# Patient Record
Sex: MALE | Race: BLACK OR AFRICAN AMERICAN | NOT HISPANIC OR LATINO | Employment: FULL TIME | URBAN - METROPOLITAN AREA
[De-identification: names, ages, dates, MRNs, and addresses within clinical notes are randomized per-mention and may not be internally consistent; named-entity substitution may affect disease eponyms.]

---

## 2020-09-02 ENCOUNTER — OFFICE VISIT (OUTPATIENT)
Dept: URGENT CARE | Facility: CLINIC | Age: 30
End: 2020-09-02

## 2020-09-02 VITALS
TEMPERATURE: 98.2 F | OXYGEN SATURATION: 97 % | WEIGHT: 186 LBS | RESPIRATION RATE: 18 BRPM | DIASTOLIC BLOOD PRESSURE: 90 MMHG | HEIGHT: 70 IN | SYSTOLIC BLOOD PRESSURE: 138 MMHG | BODY MASS INDEX: 26.63 KG/M2 | HEART RATE: 87 BPM

## 2020-09-02 DIAGNOSIS — K12.2 UVULITIS: ICD-10-CM

## 2020-09-02 DIAGNOSIS — J02.9 PHARYNGITIS, UNSPECIFIED ETIOLOGY: ICD-10-CM

## 2020-09-02 LAB
INT CON NEG: NORMAL
INT CON POS: NORMAL
S PYO AG THROAT QL: NORMAL

## 2020-09-02 PROCEDURE — 87880 STREP A ASSAY W/OPTIC: CPT | Performed by: NURSE PRACTITIONER

## 2020-09-02 PROCEDURE — 99204 OFFICE O/P NEW MOD 45 MIN: CPT | Mod: 25 | Performed by: NURSE PRACTITIONER

## 2020-09-02 RX ORDER — AMOXICILLIN 500 MG/1
500 CAPSULE ORAL 2 TIMES DAILY
Qty: 20 CAP | Refills: 0 | Status: SHIPPED | OUTPATIENT
Start: 2020-09-02 | End: 2020-09-12

## 2020-09-02 RX ORDER — DEXAMETHASONE SODIUM PHOSPHATE 10 MG/ML
10 INJECTION INTRAMUSCULAR; INTRAVENOUS ONCE
Status: COMPLETED | OUTPATIENT
Start: 2020-09-02 | End: 2020-09-02

## 2020-09-02 RX ADMIN — DEXAMETHASONE SODIUM PHOSPHATE 10 MG: 10 INJECTION INTRAMUSCULAR; INTRAVENOUS at 19:42

## 2020-09-02 ASSESSMENT — ENCOUNTER SYMPTOMS
NECK PAIN: 0
SWOLLEN GLANDS: 1
EYE REDNESS: 0
NAUSEA: 0
SHORTNESS OF BREATH: 0
MYALGIAS: 0
VOMITING: 0
FEVER: 0
DIARRHEA: 0
COUGH: 0
HEADACHES: 0
DIZZINESS: 0
CHILLS: 1
SORE THROAT: 1
TROUBLE SWALLOWING: 1

## 2020-09-02 NOTE — LETTER
September 2, 2020         Patient: Gian Godwin   YOB: 1990   Date of Visit: 9/2/2020           To Whom it May Concern:    Gian Godwin was seen in my clinic on 9/2/2020. He may return to work on 9/4/2020.    If you have any questions or concerns, please don't hesitate to call.        Sincerely,           JONATHAN Estrada  Electronically Signed

## 2020-09-03 NOTE — PROGRESS NOTES
Subjective:     Gian Godwin  is a 29 y.o. male who presents for Oral Swelling (x 2 days )  Patient with no recent travel and/or close contact with COVID-19.        Pharyngitis   This is a new problem. Episode onset: 2 days;  The problem has been gradually worsening. Neither side of throat is experiencing more pain than the other. The maximum temperature recorded prior to his arrival was 100.4 - 100.9 F. The fever has been present for less than 1 day. The pain is at a severity of 8/10. The pain is moderate. Associated symptoms include swollen glands and trouble swallowing. Pertinent negatives include no congestion, coughing, diarrhea, drooling, ear discharge, ear pain, headaches, neck pain, shortness of breath or vomiting. He has had no exposure to strep or mono. He has tried acetaminophen for the symptoms. The treatment provided no relief.   History reviewed. No pertinent past medical history.History reviewed. No pertinent surgical history.  Social History     Socioeconomic History   • Marital status: Single     Spouse name: Not on file   • Number of children: Not on file   • Years of education: Not on file   • Highest education level: Not on file   Occupational History   • Not on file   Social Needs   • Financial resource strain: Not on file   • Food insecurity     Worry: Not on file     Inability: Not on file   • Transportation needs     Medical: Not on file     Non-medical: Not on file   Tobacco Use   • Smoking status: Not on file   Substance and Sexual Activity   • Alcohol use: Not on file   • Drug use: Not on file   • Sexual activity: Not on file   Lifestyle   • Physical activity     Days per week: Not on file     Minutes per session: Not on file   • Stress: Not on file   Relationships   • Social connections     Talks on phone: Not on file     Gets together: Not on file     Attends [employer] service: Not on file     Active member of club or organization: Not on file     Attends meetings of clubs or  "organizations: Not on file     Relationship status: Not on file   • Intimate partner violence     Fear of current or ex partner: Not on file     Emotionally abused: Not on file     Physically abused: Not on file     Forced sexual activity: Not on file   Other Topics Concern   • Not on file   Social History Narrative   • Not on file    History reviewed. No pertinent family history. Review of Systems   Constitutional: Positive for chills. Negative for fever.   HENT: Positive for sore throat and trouble swallowing. Negative for congestion, drooling, ear discharge and ear pain.    Eyes: Negative for redness.   Respiratory: Negative for cough and shortness of breath.    Cardiovascular: Negative for chest pain.   Gastrointestinal: Negative for diarrhea, nausea and vomiting.   Genitourinary: Negative for dysuria.   Musculoskeletal: Negative for myalgias and neck pain.   Skin: Negative for rash.   Neurological: Negative for dizziness and headaches.   No Known Allergies   Objective:   /90   Pulse 87   Temp 36.8 °C (98.2 °F) (Temporal)   Resp 18   Ht 1.778 m (5' 10\")   Wt 84.4 kg (186 lb)   SpO2 97%   BMI 26.69 kg/m²   Physical Exam  Vitals signs and nursing note reviewed.   Constitutional:       General: He is not in acute distress.     Appearance: He is well-developed.   HENT:      Head: Normocephalic and atraumatic.      Right Ear: External ear normal.      Left Ear: External ear normal.      Nose: Nose normal.      Mouth/Throat:      Mouth: Mucous membranes are moist.      Pharynx: Pharyngeal swelling, posterior oropharyngeal erythema and uvula swelling present.      Tonsils: 1+ on the right. 1+ on the left.   Eyes:      Conjunctiva/sclera: Conjunctivae normal.   Cardiovascular:      Rate and Rhythm: Normal rate.   Pulmonary:      Effort: Pulmonary effort is normal. No respiratory distress.      Breath sounds: Normal breath sounds.   Abdominal:      General: There is no distension.   Musculoskeletal: Normal " range of motion.   Lymphadenopathy:      Cervical: Cervical adenopathy present.   Skin:     General: Skin is warm and dry.   Neurological:      General: No focal deficit present.      Mental Status: He is alert and oriented to person, place, and time. Mental status is at baseline.      Gait: Gait (gait at baseline) normal.   Psychiatric:         Judgment: Judgment normal.           Assessment/Plan:   Assessment    1. Pharyngitis, unspecified etiology  - POCT Rapid Strep A  - dexamethasone (DECADRON) injection (check route below) 10 mg  - amoxicillin (AMOXIL) 500 MG Cap; Take 1 Cap by mouth 2 times a day for 10 days.  Dispense: 20 Cap; Refill: 0    2. Uvulitis  - dexamethasone (DECADRON) injection (check route below) 10 mg  - amoxicillin (AMOXIL) 500 MG Cap; Take 1 Cap by mouth 2 times a day for 10 days.  Dispense: 20 Cap; Refill: 0  Strep negative  Advised to continue supportive care with Tylenol and/or ibuprofen for fevers and discomfort. Increased fluids and electrolytes.   Differential diagnosis, natural history, supportive care, and indications for immediate follow-up discussed.

## 2021-07-03 ENCOUNTER — OFFICE VISIT (OUTPATIENT)
Dept: URGENT CARE | Facility: CLINIC | Age: 31
End: 2021-07-03

## 2021-07-03 VITALS
SYSTOLIC BLOOD PRESSURE: 102 MMHG | DIASTOLIC BLOOD PRESSURE: 70 MMHG | BODY MASS INDEX: 25.9 KG/M2 | RESPIRATION RATE: 16 BRPM | HEIGHT: 71 IN | TEMPERATURE: 98.1 F | WEIGHT: 185 LBS | HEART RATE: 66 BPM | OXYGEN SATURATION: 100 %

## 2021-07-03 DIAGNOSIS — S86.911A STRAIN OF RIGHT KNEE, INITIAL ENCOUNTER: ICD-10-CM

## 2021-07-03 DIAGNOSIS — M25.561 ACUTE PAIN OF RIGHT KNEE: ICD-10-CM

## 2021-07-03 PROCEDURE — 99214 OFFICE O/P EST MOD 30 MIN: CPT | Performed by: NURSE PRACTITIONER

## 2021-07-03 RX ORDER — NAPROXEN 500 MG/1
500 TABLET ORAL
Qty: 60 TABLET | Refills: 0 | Status: SHIPPED | OUTPATIENT
Start: 2021-07-03 | End: 2023-10-12

## 2021-07-03 RX ORDER — NAPROXEN 500 MG/1
500 TABLET ORAL
Qty: 40 TABLET | Refills: 0 | Status: SHIPPED | OUTPATIENT
Start: 2021-07-03 | End: 2021-07-03

## 2021-07-03 RX ORDER — FLUTICASONE PROPIONATE 50 MCG
2 SPRAY, SUSPENSION (ML) NASAL DAILY
Qty: 16 G | Refills: 0 | Status: SHIPPED | OUTPATIENT
Start: 2021-07-03 | End: 2021-07-03

## 2021-07-03 RX ORDER — NAPROXEN 500 MG/1
500 TABLET ORAL
Qty: 60 TABLET | Refills: 0 | Status: SHIPPED | OUTPATIENT
Start: 2021-07-03 | End: 2021-07-03 | Stop reason: SDUPTHER

## 2021-07-03 ASSESSMENT — LIFESTYLE VARIABLES: SUBSTANCE_ABUSE: 0

## 2021-07-03 ASSESSMENT — ENCOUNTER SYMPTOMS
FALLS: 0
NAUSEA: 0
CHILLS: 0
FEVER: 0

## 2021-07-04 NOTE — NON-PROVIDER
Patient reported having similar pain in knees in middle school, which self-resolved after several months. He was not seen for it at the time.

## 2021-07-04 NOTE — PROGRESS NOTES
"Gian Godwin is a 30 y.o. male who presents for Knee Pain (x 1 day, R knee pain/stiffness, atraumatic, limited ROM, tender to palpation)      HPI this new problem.  Fredy is a 30-year-old male patient presents with right knee pain x1 day.  He is experiencing pain and stiffness in his knee.  He finds it hard to flex his knee fully..  It hurts when he tries to drive.  Pressing on the gas makes it more painful.  His range of motion is generally limited.  He has pain when he palpates around the lower knee.  He had an injury when he was in middle school that he recalls that was never treated.  It took months for his knee to heal but it healed up just fine.  He has never had any other problems.  He denies trauma or unusual activity.  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  Treatments tried: Aspirin.  He has had no relief of his pain.  No other aggravating alleviating factors.    Review of Systems   Constitutional: Negative for chills and fever.   Gastrointestinal: Negative for nausea.   Musculoskeletal: Positive for joint pain. Negative for falls.   Endo/Heme/Allergies: Negative for environmental allergies.   Psychiatric/Behavioral: Negative for substance abuse.       Allergies:     No Known Allergies    PMSFS Hx:  History reviewed. No pertinent past medical history.  History reviewed. No pertinent surgical history.  History reviewed. No pertinent family history.  Social History     Tobacco Use   • Smoking status: Unknown If Ever Smoked   Substance Use Topics   • Alcohol use: Not on file       Problems:   There is no problem list on file for this patient.      Medications:   No current outpatient medications on file prior to visit.     No current facility-administered medications on file prior to visit.          Objective:     /70 (BP Location: Left arm, Patient Position: Sitting, BP Cuff Size: Adult)   Pulse 66   Temp 36.7 °C (98.1 °F) (Temporal)   Resp 16   Ht 1.803 m (5' 11\")   Wt 83.9 kg (185 lb)   SpO2 100% "   BMI 25.80 kg/m²     Physical Exam  Vitals reviewed.   Constitutional:       General: He is not in acute distress.     Appearance: Normal appearance. He is normal weight.   Cardiovascular:      Rate and Rhythm: Normal rate.      Pulses: Normal pulses.   Pulmonary:      Effort: Pulmonary effort is normal.   Musculoskeletal:      Right knee: Swelling (mild infrapatellar swelling) and crepitus present. No effusion, erythema or bony tenderness. Decreased range of motion (flexion is limited). No LCL laxity, MCL laxity, ACL laxity or PCL laxity. Normal alignment, normal meniscus and normal patellar mobility. Normal pulse.      Instability Tests: Anterior drawer test negative. Posterior drawer test negative. Anterior Lachman test negative. Medial Kody test negative and lateral Kody test negative.      Right lower leg: Normal.   Skin:     General: Skin is warm.      Capillary Refill: Capillary refill takes less than 2 seconds.   Neurological:      Mental Status: He is alert.   Psychiatric:         Mood and Affect: Mood normal.         Behavior: Behavior normal.         Thought Content: Thought content normal.         Assessment /Associated Orders:      1. Strain of right knee, initial encounter  REFERRAL TO SPORTS MEDICINE    naproxen (NAPROSYN) 500 MG Tab    DISCONTINUED: naproxen (NAPROSYN) 500 MG Tab   2. Acute pain of right knee  REFERRAL TO SPORTS MEDICINE    naproxen (NAPROSYN) 500 MG Tab    DISCONTINUED: naproxen (NAPROSYN) 500 MG Tab       Medical Decision Making:    Pt is clinically stable at today's acute urgent care visit.  No acute distress noted. Appropriate for outpatient management at this time.   Acute problem today with uncertain prognosis.   Educated in proper administration of medication(s) ordered today including safety, possible SE, risks, benefits, rationale and alternatives to therapy. Take with food. Do not take additional NSAIDS while using Naprosyn . OTC acetaminophen for breakthrough  pain. Dosage and directions per . Do not exceed 3000 mg in 24 hours.   Ice packs  Declines knee brace at this time   OTC analgesic cream/ gel of choice prn pain     Advised to follow-up with the primary care provider for recheck, reevaluation, and consideration of further management if necessary.   Discussed management options (risks,benefits, and alternatives to treatment). Expressed understanding and the treatment plan was agreed upon. Questions were encouraged and answered       Return to urgent care prn if new or worsening sx or if there is no improvement in condition prn.  Educated in Red flags and indications to immediately call 911 or present to the Emergency Department.     I personally reviewed prior external notes and test results pertinent to today's visit.  I have independently reviewed and interpreted all diagnostics ordered during this urgent care acute visit.   Time spent evaluating this patient was at least 30 minutes and includes preparing for visit, counseling/education, exam and evaluation, obtaining history, independent interpretation, ordering lab/test/procedures,medication management and documentation.

## 2021-12-20 ENCOUNTER — OFFICE VISIT (OUTPATIENT)
Dept: URGENT CARE | Facility: CLINIC | Age: 31
End: 2021-12-20
Payer: COMMERCIAL

## 2021-12-20 ENCOUNTER — APPOINTMENT (OUTPATIENT)
Dept: RADIOLOGY | Facility: IMAGING CENTER | Age: 31
End: 2021-12-20
Attending: STUDENT IN AN ORGANIZED HEALTH CARE EDUCATION/TRAINING PROGRAM
Payer: COMMERCIAL

## 2021-12-20 VITALS
SYSTOLIC BLOOD PRESSURE: 126 MMHG | RESPIRATION RATE: 18 BRPM | WEIGHT: 180 LBS | TEMPERATURE: 97.2 F | HEIGHT: 71 IN | BODY MASS INDEX: 25.2 KG/M2 | OXYGEN SATURATION: 100 % | HEART RATE: 80 BPM | DIASTOLIC BLOOD PRESSURE: 58 MMHG

## 2021-12-20 DIAGNOSIS — M25.561 ACUTE PAIN OF RIGHT KNEE: ICD-10-CM

## 2021-12-20 PROCEDURE — 99214 OFFICE O/P EST MOD 30 MIN: CPT | Performed by: STUDENT IN AN ORGANIZED HEALTH CARE EDUCATION/TRAINING PROGRAM

## 2021-12-20 PROCEDURE — 73564 X-RAY EXAM KNEE 4 OR MORE: CPT | Mod: TC,FY,RT | Performed by: NURSE PRACTITIONER

## 2021-12-20 RX ORDER — LIDOCAINE 4 G/G
1 PATCH TOPICAL DAILY
Qty: 5 PATCH | Refills: 0 | Status: SHIPPED | OUTPATIENT
Start: 2021-12-20 | End: 2021-12-25

## 2021-12-20 ASSESSMENT — ENCOUNTER SYMPTOMS
FEVER: 0
EYE PAIN: 0
HEADACHES: 0
CARDIOVASCULAR NEGATIVE: 1
FALLS: 0
CHILLS: 0
NAUSEA: 0
RESPIRATORY NEGATIVE: 1
VOMITING: 0

## 2021-12-20 NOTE — PROGRESS NOTES
"Subjective     Gian Godwin is a 31 y.o. male who presents with Knee Pain (RIGHT, continued since previous visit, worsening over time)            HPI    Patient reported R Knee pain and swelling for 3 days. The pain is right under knee area, 6/10 in intensity, stiff in the morning, worsening with the flexing and lateral stretching. He took naproxen, but didn't help. Reported mild numbness with bending. He denies significant trauma or injury. His work is related to some heavy lifting.   He reported similar right knee pain in 7/2021. He was prescribed for naproxen at that time. He said the pain was resolved but he started to have knee pain again 3 days ago    Review of Systems   Constitutional: Negative for chills and fever.   Eyes: Negative for pain.   Respiratory: Negative.    Cardiovascular: Negative.    Gastrointestinal: Negative for nausea and vomiting.   Musculoskeletal: Positive for joint pain. Negative for falls.   Skin: Negative for itching.   Neurological: Negative for headaches.              Objective     /58   Pulse 80   Temp 36.2 °C (97.2 °F) (Temporal)   Resp 18   Ht 1.803 m (5' 11\")   Wt 81.6 kg (180 lb)   SpO2 100%   BMI 25.10 kg/m²      Physical Exam  Constitutional:       Appearance: Normal appearance.   HENT:      Head: Normocephalic.      Nose: Nose normal.      Mouth/Throat:      Mouth: Mucous membranes are moist.   Eyes:      Extraocular Movements: Extraocular movements intact.   Cardiovascular:      Rate and Rhythm: Normal rate.      Pulses: Normal pulses.   Pulmonary:      Effort: Pulmonary effort is normal.   Musculoskeletal:         General: Swelling, tenderness and deformity present. No signs of injury.      Cervical back: Normal range of motion.        Legs:       Comments: A bony prominence at anterior  tibia tuberosity of R knee,  tender, swelling, worsening pain with flexion and lateral extension. No erythema or drainage.    Skin:     General: Skin is warm. "   Neurological:      Mental Status: He is alert and oriented to person, place, and time.   Psychiatric:         Mood and Affect: Mood normal.                             Assessment & Plan        There are no diagnoses linked to this encounter.        1. Acute pain of right knee    - XRAY: Soft tissue swelling overlying the tibial insertion of the patellar tendon. This can be seen with jumper's knee in a patient of this age; No acute fracture or dislocation.    - Lidocaine (HM LIDOCAINE PATCH) 4 % Patch; Apply 1 Patch topically every day for 5 days.  Dispense: 5 Patch; Refill: 0  - Referral to Sports Medicine    A bony prominence at anterior  tibia tuberosity of R knee,  tender, swelling, worsening pain with flexion and lateral extension  Could be due to Osgood-Schlatter disease (OSD), although it happens in younger people or athelets. Patient did report possible hx of  Osgood-Schlatter disease when he was in middle school.   Supportive care, avoid intensive activities. Ordered lidocaine patch and referred him to sport medicine.     Patient was advised to go to ER or urgent care if symptoms are not improved or getting worse

## 2022-06-28 ENCOUNTER — APPOINTMENT (OUTPATIENT)
Dept: RADIOLOGY | Facility: IMAGING CENTER | Age: 32
End: 2022-06-28
Attending: PHYSICIAN ASSISTANT
Payer: COMMERCIAL

## 2022-06-28 ENCOUNTER — OFFICE VISIT (OUTPATIENT)
Dept: URGENT CARE | Facility: CLINIC | Age: 32
End: 2022-06-28
Payer: COMMERCIAL

## 2022-06-28 VITALS
SYSTOLIC BLOOD PRESSURE: 120 MMHG | TEMPERATURE: 97.2 F | OXYGEN SATURATION: 97 % | DIASTOLIC BLOOD PRESSURE: 80 MMHG | HEIGHT: 68 IN | HEART RATE: 76 BPM | BODY MASS INDEX: 28.52 KG/M2 | RESPIRATION RATE: 16 BRPM | WEIGHT: 188.2 LBS

## 2022-06-28 DIAGNOSIS — M79.671 BILATERAL FOOT PAIN: ICD-10-CM

## 2022-06-28 DIAGNOSIS — M79.672 BILATERAL FOOT PAIN: ICD-10-CM

## 2022-06-28 DIAGNOSIS — R21 RASH AND NONSPECIFIC SKIN ERUPTION: ICD-10-CM

## 2022-06-28 PROCEDURE — 73630 X-RAY EXAM OF FOOT: CPT | Mod: TC,LT | Performed by: RADIOLOGY

## 2022-06-28 PROCEDURE — 73630 X-RAY EXAM OF FOOT: CPT | Mod: TC,RT | Performed by: RADIOLOGY

## 2022-06-28 PROCEDURE — 99214 OFFICE O/P EST MOD 30 MIN: CPT | Performed by: PHYSICIAN ASSISTANT

## 2022-06-28 RX ORDER — METHYLPREDNISOLONE 4 MG/1
TABLET ORAL
Qty: 21 TABLET | Refills: 0 | Status: SHIPPED | OUTPATIENT
Start: 2022-06-28 | End: 2023-10-12

## 2022-06-28 ASSESSMENT — ENCOUNTER SYMPTOMS
VOMITING: 0
TINGLING: 0
FEVER: 0
CHILLS: 0
NAUSEA: 0
FOCAL WEAKNESS: 0
SENSORY CHANGE: 0

## 2022-06-29 NOTE — PROGRESS NOTES
"Subjective     Gian Godwin is a 31 y.o. male who presents with Foot Problem and Foot Swelling (Pt states he went to the river yesterday and ever since his feet are swollen, red, and feel warm.)            The patient is here today with complaints of bilateral foot swelling, redness, and itching on the bottom of feet since yesterday. He states symptoms started after being in the river yesterday. He was not wearing shoes and denies any scratch or puncture wound. He denies any injury. He denies fever, chills, nausea,  Or vomiting. He also complains of bilateral first MTP joint pain for over the past year without injury. He has no history of DM or numbness or tingling.       History reviewed. No pertinent past medical history.    History reviewed. No pertinent surgical history.    History reviewed. No pertinent family history.    No Known Allergies    Medications, Allergies, and current problem list reviewed today in Epic      Review of Systems   Constitutional: Negative for chills, fever and malaise/fatigue.   Gastrointestinal: Negative for nausea and vomiting.   Musculoskeletal: Positive for joint pain (bilateral 1st MTP joint pain).   Skin:        Itching, swelling and redness on the bottom of both feet   Neurological: Negative for tingling, sensory change and focal weakness.     All other systems reviewed and are negative.            Objective     /80 (BP Location: Right arm, Patient Position: Sitting, BP Cuff Size: Adult long)   Pulse 76   Temp 36.2 °C (97.2 °F) (Temporal)   Resp 16   Ht 1.727 m (5' 8\")   Wt 85.4 kg (188 lb 3.2 oz)   SpO2 97%   BMI 28.62 kg/m²      Physical Exam  Constitutional:       Appearance: Normal appearance. He is not ill-appearing.   HENT:      Head: Normocephalic and atraumatic.   Eyes:      Conjunctiva/sclera: Conjunctivae normal.   Cardiovascular:      Rate and Rhythm: Normal rate and regular rhythm.   Pulmonary:      Effort: Pulmonary effort is normal. No respiratory " distress.      Breath sounds: No stridor. No wheezing.   Feet:      Comments: Minor hallux deformity of both feet. Mild TTP over Right MTP. Soles of bilateral feet with erythema and edema. Skin intact without puncture wounds or abrasions.  Skin:     General: Skin is warm and dry.      Findings: Erythema present.   Neurological:      General: No focal deficit present.      Mental Status: He is alert and oriented to person, place, and time.   Psychiatric:         Mood and Affect: Mood normal.         Behavior: Behavior normal.         Thought Content: Thought content normal.         Judgment: Judgment normal.       6/28/2022 5:39 PM     HISTORY/REASON FOR EXAM:  Bilateral foot pain in the 1st MTP joint for greater than one year with no known injury        TECHNIQUE/EXAM DESCRIPTION AND NUMBER OF VIEWS:  3 views of the RIGHT foot.     COMPARISON:  None     FINDINGS:     There is no focal soft tissue swelling.     There is no evidence of displaced fracture or dislocation.     The alignment is maintained.     There is an incidental bipartite 1st medial MTP joint sesamoid bone.     IMPRESSION:     1.  Unremarkable right foot series.                6/28/2022 5:39 PM     HISTORY/REASON FOR EXAM:  Bilateral foot pain in the 1st MTP joint region for greater than one year with no known injury.        TECHNIQUE/EXAM DESCRIPTION AND NUMBER OF VIEWS:  3 views of the LEFT foot.     COMPARISON:  None     FINDINGS:     There is no focal soft tissue swelling.     There is no evidence of displaced fracture or dislocation.     The alignment is maintained.           IMPRESSION:     1.  Unremarkable left foot series.         Assessment & Plan        1. Bilateral foot pain  DX-FOOT-COMPLETE 3+ RIGHT    DX-FOOT-COMPLETE 3+ LEFT    methylPREDNISolone (MEDROL DOSEPAK) 4 MG Tablet Therapy Pack    Referral to Podiatry   2. Rash and nonspecific skin eruption         Suspect some time of allergic reaction and dermatitis from river exposure  yesterday.  RX Medrol dosepak.  OTC Benadryl prn  Referral placed to Podiatry for chronic bilateral foot pain.    Differential diagnoses, Supportive care, and indications for immediate follow-up discussed with patient.   Pathogenesis of diagnosis discussed including typical length and natural progression.   Instructed to return to clinic or nearest emergency department for any change in condition, further concerns, or worsening of symptoms.    The patient demonstrated a good understanding and agreed with the treatment plan.    Leatha Nguyen P.A.-C.

## 2023-02-12 ENCOUNTER — APPOINTMENT (OUTPATIENT)
Dept: RADIOLOGY | Facility: IMAGING CENTER | Age: 33
End: 2023-02-12
Attending: PHYSICIAN ASSISTANT
Payer: COMMERCIAL

## 2023-02-12 ENCOUNTER — OCCUPATIONAL MEDICINE (OUTPATIENT)
Dept: URGENT CARE | Facility: CLINIC | Age: 33
End: 2023-02-12
Payer: OTHER MISCELLANEOUS

## 2023-02-12 VITALS
DIASTOLIC BLOOD PRESSURE: 92 MMHG | TEMPERATURE: 97.4 F | WEIGHT: 200 LBS | HEART RATE: 78 BPM | BODY MASS INDEX: 28 KG/M2 | SYSTOLIC BLOOD PRESSURE: 144 MMHG | HEIGHT: 71 IN | RESPIRATION RATE: 16 BRPM | OXYGEN SATURATION: 98 %

## 2023-02-12 DIAGNOSIS — Z02.6 ENCOUNTER RELATED TO WORKER'S COMPENSATION CLAIM: ICD-10-CM

## 2023-02-12 DIAGNOSIS — S63.8X2A: ICD-10-CM

## 2023-02-12 DIAGNOSIS — S63.649A SPRAIN OF RADIAL COLLATERAL LIGAMENT OF METACARPOPHALANGEAL (MCP) JOINT OF THUMB: ICD-10-CM

## 2023-02-12 DIAGNOSIS — M79.645 THUMB PAIN, LEFT: ICD-10-CM

## 2023-02-12 PROCEDURE — 99214 OFFICE O/P EST MOD 30 MIN: CPT | Performed by: PHYSICIAN ASSISTANT

## 2023-02-12 PROCEDURE — 73140 X-RAY EXAM OF FINGER(S): CPT | Mod: TC,FY,LT | Performed by: PHYSICIAN ASSISTANT

## 2023-02-12 NOTE — LETTER
[facility] [address] - [place] [address]  Phone:  [phone] - Fax:  [phone]   [facility] Progress Report and Disability Certification  Date of Service: 2/12/2023   No Show:  No  Date / Time of Next Visit: 2/17/2023 ([facility] MED)   Claim Information   Patient Name: Gian Godwin  Claim Number:     Employer:  [employer] Date of Injury: 12/20/2022     Insurer / TPA: M[employer] Workers Comp  ID / SSN:     Occupation:   Diagnosis: Diagnoses of Thumb pain, left, Carpometacarpal joint sprains, left, initial encounter, Sprain of radial collateral ligament of metacarpophalangeal (MCP) joint of thumb, and Encounter related to worker's compensation claim were pertinent to this visit.    Medical Information   Related to Industrial Injury? Yes    Subjective Complaints:  DOI 12/20/2022  Hour injury 9 AM  Employer: [employer]  This is a pleasant 32-year-old male who was grabbing some tools when he jammed his thumb into the ground.  He felt it pop towards his palm.   This affected his left side.  He states that when he inadvertently hits his thumb he feels a popping sensation and possible movement and then it feels normal.  He feels that happen intermittently 2-3x/day.  He states when it first occurred it was very painful but not swollen.  He hit it about a month later and developed some swelling which has resolved.  He hasn't needed to use or otc meds.  He is right handed.  No known  prior injury on the left hand.  No other employment.   Objective Findings: There is no significant soft tissue swelling.  No ecchymosis.  There is some tenderness over the left CMC joint predominantly on the radial side in the region of the radial collateral ligament.  There is mild tenderness to the volar side of the CMC joint.  There is no scaphoid tenderness.  Full range of motion is noted at the IP joint.  Full range of motion is noted  with thumb extension and opposition, pain/sense of instability primarily with flexion although flexion strength appears full.  Neurovascularly intact.  Distal pulses intact.  Wrist nontender.    He states this is only minimally hindering his ability to work, he is more afraid of the sense of popping and instability with certain movements.   Pre-Existing Condition(s): None   Assessment:   Initial Visit    Status: Discharged / Care Transfer  Permanent Disability:No    Plan:   Comments:Tylenol/ibuprofen as needed for pain    Diagnostics: X-ray    Comments:    RADIOLOGY RESULTS  DX-FINGER(S) 2+ LEFT    Result Date: 2/12/2023 2/12/2023 3:14 PM HISTORY/REASON FOR EXAM:  Pain/Deformity Following Trauma; Pain at left 1st CMC joint, sense of instability, status post jamming finger 6 weeks ago. TECHNIQUE/EXAM DESCRIPTION AND NUMBER OF VIEWS:  3 views of the LEFT fingers. COMPARISON: None FINDINGS: There is no evidence of acute fracture, dislocation, or radiopaque foreign body. No soft tissue abnormalities. Radiocarpal joint is unremarkable.     1.  No fracture or malalignment of the left 1st digit.              Disability Information   Status: Released to Full Duty    From:  2/12/2023  Through: 2/17/2023 Restrictions are: Temporary   Physical Restrictions   Sitting:    Standing:    Stooping:    Bending:      Squatting:    Walking:    Climbing:    Pushing:      Pulling:    Other:    Reaching Above Shoulder (L):   Reaching Above Shoulder (R):       Reaching Below Shoulder (L):    Reaching Below Shoulder (R):      Not to exceed Weight Limits   Carrying(hrs):   Weight Limit(lb):   Lifting(hrs):   Weight  Limit(lb):     Comments: No restrictions at this time, patient feels capable of doing all of his job duties   Examination consistent with possible ligamentous injury and instability  Referral to occupational medicine for take over care given chronicity referral placed to hand of symptoms and CMC joint instability perception,  possible radial collateral ligament involvement  Tylenol/ibuprofen as needed for pain  Referral placed to hand specialist/surgeon for further evaluation and treatment, consultation participation      Repetitive Actions   Hands: i.e. Fine Manipulations from Grasping:     Feet: i.e. Operating Foot Controls:     Driving / Operate Machinery:     Health Care Provider’s Original or Electronic Signature  Justina Camarena P.A.-C. Health Care Provider’s Original or Electronic Signature    Helio Craven DO MPH     Clinic Name / Location: [address]  [place] [address] Clinic Phone Number: Dept: [phone]   Appointment Time: 1:45 Pm Visit Start Time: 2:35 PM   Check-In Time:  2:09 Pm Visit Discharge Time:  4:03 PM   Original-Treating Physician or Chiropractor    Page 2-Insurer/TPA    Page 3-Employer    Page 4-Employee

## 2023-02-12 NOTE — LETTER
"EMPLOYEE’S CLAIM FOR COMPENSATION/ REPORT OF INITIAL TREATMENT  FORM C-4    EMPLOYEE’S CLAIM - PROVIDE ALL INFORMATION REQUESTED   First Name  Gian Last Name  Tato Birthdate                    1990                Sex  male Claim Number (Insurer’s Use Only)    Home Address  [address] Age  32 y.o. Height  1.803 m (5' 11\") Weight  90.7 kg (200 lb) [facility]     [facility] Zip  [address] Telephone  [phone] (home)    Mailing Address  [address] [facility] Zip  [address] Primary Language Spoken  English    Insurer   Third-Party   Misc Workers Comp   Employee's Occupation (Job Title) When Injury or Occupational Disease Occurred      Employer's Name/Company Name    [employer]  Telephone      Office Mail Address (Number and Street)    [address] Zip   [address]   Date of Injury  12/20/2022               Hours Injury  9:00 AM Date Employer Notified  N/A Last Day of Work after Injury     or Occupational Disease  2/9/2023 Supervisor to Whom Injury     Reported  IN[employer]S   Address or Location of Accident (if applicable)  Work [1]   What were you doing at the time of accident? (if applicable)  GRABBING TOOLS    How did this injury or occupational disease occur? (Be specific an answer in detail. Use additional sheet if necessary)  WENT TO GRAB THEN IT JAMMED IT ON THE GROUND   If you believe that you have an occupational disease, when did you first have knowledge of the disability and it relationship to your employment?   Witnesses to the Accident  N/A      Nature of Injury or Occupational Disease  Workers' Compensation  Part(s) of Body Injured or Affected  Thumb (L), N/A, N/A    I certify that the above is true and correct to the best of my knowledge and that I have provided this information in order to obtain the benefits of [place]’s " [place]  I hereby authorize any physician, chiropractor, surgeon, practitioner, or other person, any hospital, including [facility] or [facility], any medical service organization, any insurance company, or other institution or organization to release to each other, any medical or other information, including benefits paid or payable, pertinent to this injury or disease, except information relative to diagnosis, treatment and/or counseling for AIDS, psychological conditions, alcohol or controlled substances, for which I must give specific authorization.  A Photostat of this authorization shall be as valid as the original.     Date 02/12/2023   [facility]  Employee’s Original or  *Electronic Signature   THIS REPORT MUST BE COMPLETED AND MAILED WITHIN 3 WORKING DAYS OF TREATMENT   [facility]  Name of Facility  [facility]   Date  2/12/2023 Diagnosis and Description of Injury or Occupational Disease  (M79.645) Thumb pain, left  (S63.8X2A) Carpometacarpal joint sprains, left, initial encounter  (S63.649A) Sprain of radial collateral ligament of metacarpophalangeal (MCP) joint of thumb  (Z02.6) Encounter related to worker's compensation claim Is there evidence the injured employee was under the influence of alcohol and/or another controlled substance at the time of accident?  ? No ? Yes (if yes, please explain)    Hour  2:35 PM   Diagnoses of Thumb pain, left, Carpometacarpal joint sprains, left, initial encounter, Sprain of radial collateral ligament of metacarpophalangeal (MCP) joint of thumb, and Encounter related to worker's compensation claim were pertinent to this visit. No   Treatment  No restrictions at this time, patient feels capable of doing all of his job duties   Examination consistent with possible ligamentous injury and instability  Referral to occupational  medicine for take over care given chronicity referral placed to hand of symptoms and CMC joint instability perception, possible radial collateral ligament involvement  Tylenol/ibuprofen as needed for pain  Referral placed to hand specialist/surgeon for further evaluation and treatment, consultation participation  Have you advised the patient to remain off work five days or     more?    X-Ray Findings  Negative   ? Yes Indicate dates:   From   To      From information given by the employee, together with medical evidence, can        you directly connect this injury or occupational disease as job incurred?  Yes ? No If no, is the injured employee capable of:  ? full duty  Yes ? modified duty      Is additional medical care by a physician indicated?  Yes If Modified Duty, Specify any Limitations / Restrictions      Do you know of any previous injury or disease contributing to this condition or occupational disease?  ? Yes ? No (Explain if yes)                          No   Date  2/12/2023 Print Health Care Provider's   Justina Camarena P.A.-C. I certify the employer’s copy of  this form was mailed on:   Address  [address] Insurer’s Use Only     [place]  [address]    Provider’s Tax ID Number  [id number] Telephone  Dept: [phone]             Health Care Provider’s Original or Electronic Signature  e-SignJUSTINA CAMARENA P.A.-C. Degree (MD,DO, DC,PA-C,APRN)   P.A.C.       * Complete and attach Release of Information (Form C-4A) when injured employee signs C-4 Form electronically  ORIGINAL - TREATING HEALTHCARE PROVIDER PAGE 2 - INSURER/TPA PAGE 3 - EMPLOYER PAGE 4 - EMPLOYEE             Form C-4 (rev.08/21)           BRIEF DESCRIPTION OF RIGHTS AND BENEFITS  (Pursuant to [place] 616C.050)    Notice of Injury or Occupational Disease (Incident Report Form C-1): If an injury or occupational disease (OD) arises out of and in the course of employment, you must provide written  "notice to your employer as soon as practicable, but no later than 7 days after the accident or OD. Your employer shall maintain a sufficient supply of the required forms.    Claim for Compensation (Form C-4): If medical treatment is sought, the form C-4 is available at the place of initial treatment. A completed \"Claim for Compensation\" (Form C-4) must be filed within 90 days after an accident or OD. The treating physician or chiropractor must, within 3 working days after treatment, complete and mail to the employer, the employer's insurer and third-party , the Claim for Compensation.    Medical Treatment: If you require medical treatment for your on-the-job injury or OD, you may be required to select a physician or chiropractor from a list provided by your workers’ compensation insurer, if it has contracted with an Organization for Managed Care (MCO) or Preferred Provider Organization (PPO) or providers of health care. If your employer has not entered into a contract with an MCO or PPO, you may select a physician or chiropractor from the Panel of Physicians and Chiropractors. Any medical costs related to your industrial injury or OD will be paid by your insurer.    Temporary Total Disability (TTD): If your doctor has certified that you are unable to work for a period of at least 5 consecutive days, or 5 cumulative days in a 20-day period, or places restrictions on you that your employer does not accommodate, you may be entitled to TTD compensation.    Temporary Partial Disability (TPD): If the wage you receive upon reemployment is less than the compensation for TTD to which you are entitled, the insurer may be required to pay you TPD compensation to make up the difference. TPD can only be paid for a maximum of 24 months.    Permanent Partial Disability (PPD): When your medical condition is stable and there is an indication of a PPD as a result of your injury or OD, within 30 days, your insurer must " arrange for an evaluation by a rating physician or chiropractor to determine the degree of your PPD. The amount of your PPD award depends on the date of injury, the results of the PPD evaluation, your age and wage.    Permanent Total Disability (PTD): If you are medically certified by a treating physician or chiropractor as permanently and totally disabled and have been granted a PTD status by your insurer, you are entitled to receive monthly benefits not to exceed 66 2/3% of your average monthly wage. The amount of your PTD payments is subject to reduction if you previously received a lump-sum PPD award.    Vocational Rehabilitation Services: You may be eligible for vocational rehabilitation services if you are unable to return to the job due to a permanent physical impairment or permanent restrictions as a result of your injury or occupational disease.    Transportation and Per Juan Reimbursement: You may be eligible for travel expenses and per juan associated with medical treatment.    Reopening: You may be able to reopen your claim if your condition worsens after claim closure.     Appeal Process: If you disagree with a written determination issued by the insurer or the insurer does not respond to your request, you may appeal to the Department of Administration, , by following the instructions contained in your determination letter. You must appeal the determination within 70 days from the date of the determination letter at [address], [address], [place], [place] [address], or [address], [place] [address]. If you disagree with the  decision, you may appeal to the Department of Administration, . You must file your appeal within 30 days from the date of the  decision letter at [address], [address], [place], [place] [address], or [address], [place], [place] [address]. If you disagree  with a decision of an , you may file a petition for judicial review with the District Court. You must do so within 30 days of the Appeal Officer’s decision. You may be represented by an  at your own expense or you may contact the [facility] for possible representation.    [place]  for Injured Workers (NAIW): If you disagree with a  decision, you may request that NAIW represent you without charge at an  Hearing. For information regarding denial of benefits, you may contact the [facility] at: [address], [address], [place] [address], [phone], or [address] [address], [phone]    To File a Complaint with the Division: If you wish to file a complaint with the  of the Division of Industrial Relations (DIR),  please contact the Workers’ Compensation Section, [address], [address], [place], [place] [address], telephone [phone], or [address], [address], [place], [place] [address], telephone [phone].    For assistance with Workers’ Compensation Issues: You may contact the [facility] Office for Consumer Health Assistance, [address], [address], [place], [place] [address], Toll Free 1-885.755.3580, Web site: [email or web address]/Programs/[email or web address] E-mail: [email or web address]              __________________________________________________________________                                    _________________            Employee Name / Signature                                                                                                                            Date                                                                                                                                                                                                                              D-2 (rev. 10/20)

## 2023-02-13 NOTE — PROGRESS NOTES
"Subjective     Gian Godwin is a 32 y.o. male who presents with Finger Pain (Left thumb, when applying pressure to thumb it feels like it is popping out of place then sends a pain up arm DOI 12/20/22 worker comp )      DOI 12/20/2022  Hour injury 9 AM  Employer: [employer]  This is a pleasant 32-year-old male who was grabbing some tools when he jammed his thumb into the ground.  He felt it pop towards his palm.   This affected his left side.  He states that when he inadvertently hits his thumb he feels a popping sensation and possible movement and then it feels normal.  He feels that happen intermittently 2-3x/day.  He states when it first occurred it was very painful but not swollen.  He hit it about a month later and developed some swelling which has resolved.  He hasn't needed to use or otc meds.  He is right handed.  No known  prior injury on the left hand.  No other employment.                Objective     BP (!) 144/92   Pulse 78   Temp 36.3 °C (97.4 °F) (Temporal)   Resp 16   Ht 1.803 m (5' 11\")   Wt 90.7 kg (200 lb)   SpO2 98%   BMI 27.89 kg/m²      Physical Exam  Vitals and nursing note reviewed.   Constitutional:       General: He is not in acute distress.     Appearance: Normal appearance. He is not ill-appearing, toxic-appearing or diaphoretic.   HENT:      Head: Normocephalic.      Right Ear: External ear normal.      Left Ear: External ear normal.      Nose: Nose normal.      Mouth/Throat:      Mouth: Mucous membranes are moist.   Eyes:      Extraocular Movements: Extraocular movements intact.      Conjunctiva/sclera: Conjunctivae normal.      Pupils: Pupils are equal, round, and reactive to light.   Cardiovascular:      Rate and Rhythm: Normal rate.      Pulses: Normal pulses.   Pulmonary:      Effort: Pulmonary effort is normal. No respiratory distress.   Musculoskeletal:      Cervical back: Normal range of motion.   Skin:     General: Skin is warm.      Findings: No lesion or " rash.   Neurological:      General: No focal deficit present.      Mental Status: He is alert and oriented to person, place, and time.   Psychiatric:         Thought Content: Thought content normal.         Judgment: Judgment normal.       There is no significant soft tissue swelling.  No ecchymosis.  There is some tenderness over the left CMC joint predominantly on the radial side in the region of the radial collateral ligament.  There is mild tenderness to the volar side of the CMC joint.  There is no scaphoid tenderness.  Full range of motion is noted at the IP joint.  Full range of motion is noted with thumb extension and opposition, pain/sense of instability primarily with flexion although flexion strength appears full.  Neurovascularly intact.  Distal pulses intact.  Wrist nontender.    He states this is only minimally hindering his ability to work, he is more afraid of the sense of popping and instability with certain movements.        RADIOLOGY RESULTS   DX-FINGER(S) 2+ LEFT    Result Date: 2/12/2023 2/12/2023 3:14 PM HISTORY/REASON FOR EXAM:  Pain/Deformity Following Trauma; Pain at left 1st CMC joint, sense of instability, status post jamming finger 6 weeks ago. TECHNIQUE/EXAM DESCRIPTION AND NUMBER OF VIEWS:  3 views of the LEFT fingers. COMPARISON: None FINDINGS: There is no evidence of acute fracture, dislocation, or radiopaque foreign body. No soft tissue abnormalities. Radiocarpal joint is unremarkable.     1.  No fracture or malalignment of the left 1st digit.                      Assessment & Plan        1. Thumb pain, left    - DX-FINGER(S) 2+ LEFT  - Referral to Hand Surgery    2. Carpometacarpal joint sprains, left, initial encounter    - DX-FINGER(S) 2+ LEFT  - Referral to Occupational Medicine  - Referral to Hand Surgery    3. Sprain of radial collateral ligament of metacarpophalangeal (MCP) joint of thumb    - Referral to Occupational Medicine  - Referral to Hand Surgery    4. Encounter  related to worker's compensation claim    C4 and D39 forms filled out, x-ray reviewed and independently interpreted   No restrictions at this time, patient feels capable of doing all of his job duties   Examination consistent with possible ligamentous injury and instability  Referral to occupational medicine for take over care given chronicity referral placed to hand of symptoms and CMC joint instability perception, possible radial collateral ligament involvement  Tylenol/ibuprofen as needed for pain  Referral placed to hand specialist/surgeon for further evaluation and treatment, consultation participation

## 2023-10-02 PROBLEM — M79.642 LEFT HAND PAIN: Status: ACTIVE | Noted: 2023-10-02

## 2023-10-02 PROBLEM — S62.323A: Status: ACTIVE | Noted: 2023-10-02

## 2023-12-31 ENCOUNTER — OFFICE VISIT (OUTPATIENT)
Dept: URGENT CARE | Facility: CLINIC | Age: 33
End: 2023-12-31
Payer: COMMERCIAL

## 2023-12-31 VITALS
WEIGHT: 203 LBS | TEMPERATURE: 98.4 F | HEIGHT: 71 IN | OXYGEN SATURATION: 98 % | HEART RATE: 71 BPM | SYSTOLIC BLOOD PRESSURE: 152 MMHG | BODY MASS INDEX: 28.42 KG/M2 | DIASTOLIC BLOOD PRESSURE: 94 MMHG | RESPIRATION RATE: 16 BRPM

## 2023-12-31 DIAGNOSIS — L03.312 CELLULITIS OF UPPER BACK EXCLUDING SCAPULAR REGION: ICD-10-CM

## 2023-12-31 PROCEDURE — 99213 OFFICE O/P EST LOW 20 MIN: CPT

## 2023-12-31 PROCEDURE — 3077F SYST BP >= 140 MM HG: CPT

## 2023-12-31 PROCEDURE — 3080F DIAST BP >= 90 MM HG: CPT

## 2023-12-31 RX ORDER — CEPHALEXIN 500 MG/1
500 CAPSULE ORAL 4 TIMES DAILY
Qty: 20 CAPSULE | Refills: 0 | Status: SHIPPED | OUTPATIENT
Start: 2023-12-31 | End: 2024-01-05

## 2023-12-31 RX ORDER — SULFAMETHOXAZOLE AND TRIMETHOPRIM 800; 160 MG/1; MG/1
1 TABLET ORAL 2 TIMES DAILY
Qty: 10 TABLET | Refills: 0 | Status: SHIPPED | OUTPATIENT
Start: 2023-12-31 | End: 2024-01-05

## 2023-12-31 ASSESSMENT — ENCOUNTER SYMPTOMS
FEVER: 0
CHILLS: 0

## 2023-12-31 NOTE — PROGRESS NOTES
CHIEF COMPLAINT  Chief Complaint   Patient presents with    Bump     R shoulder blade      Subjective:   Gian Godwin is a 33 y.o. male who presents for Bump (R shoulder blade )  Patient presents urgent care with complaints of swelling and pain to right upper shoulder.  Patient reports he noted swelling approximately 3 to 4 days ago, and is progressively gotten worse.  He denies any trauma to the area.  He denies any recent bug bites that he could recall.  Patient denies any fevers.  Patient denies any recent use of antibiotics for the last 3 months, but he does report surgical history late October as he had a ORIF of the third metacarpal bone to his left hand.      Review of Systems   Constitutional:  Negative for chills and fever.       PAST MEDICAL HISTORY  Patient Active Problem List    Diagnosis Date Noted    Left hand pain 10/02/2023    Displaced fracture of shaft of third metacarpal bone, left hand, initial encounter for closed fracture 10/02/2023       SURGICAL HISTORY   has a past surgical history that includes open tx metacarpal fracture single ea bone (Left, 10/12/2023).    ALLERGIES  No Known Allergies    CURRENT MEDICATIONS  Home Medications       Reviewed by Christopher A. Reyes, Med Ass't (Medical Assistant) on 12/31/23 at 0944  Med List Status: <None>     Medication Last Dose Status        Patient Isaac Taking any Medications                           SOCIAL HISTORY  Social History     Tobacco Use    Smoking status: Former     Types: Cigarettes    Smokeless tobacco: Former   Vaping Use    Vaping Use: Every day    Substances: Nicotine   Substance and Sexual Activity    Alcohol use: Yes     Alcohol/week: 4.2 oz     Types: 7 Standard drinks or equivalent per week    Drug use: Never    Sexual activity: Not on file       FAMILY HISTORY  History reviewed. No pertinent family history.      Medications, Allergies, and current problem list reviewed today in Epic.     Objective:     BP (!) 152/94 (BP  "Location: Left arm, Patient Position: Sitting, BP Cuff Size: Adult)   Pulse 71   Temp 36.9 °C (98.4 °F) (Temporal)   Resp 16   Ht 1.803 m (5' 11\")   Wt 92.1 kg (203 lb)   SpO2 98%     Physical Exam  Constitutional:       General: He is not in acute distress.     Appearance: Normal appearance. He is not ill-appearing or toxic-appearing.   HENT:      Head: Normocephalic.      Right Ear: Tympanic membrane normal.      Left Ear: Tympanic membrane normal.      Nose: Nose normal.      Mouth/Throat:      Mouth: Mucous membranes are moist.      Pharynx: Oropharynx is clear. No oropharyngeal exudate or posterior oropharyngeal erythema.   Cardiovascular:      Rate and Rhythm: Normal rate and regular rhythm.      Pulses: Normal pulses.      Heart sounds: Normal heart sounds.   Pulmonary:      Effort: Pulmonary effort is normal. No respiratory distress.      Breath sounds: Normal breath sounds. No stridor. No wheezing, rhonchi or rales.   Musculoskeletal:      Cervical back: Normal range of motion and neck supple. No rigidity or tenderness.   Lymphadenopathy:      Cervical: No cervical adenopathy.   Skin:     General: Skin is warm.      Capillary Refill: Capillary refill takes less than 2 seconds.      Findings: Erythema present. No abscess or wound.             Comments: Approximately a 3-1/2 inch area of induration and erythema, with well demarcated borders.  No pocket of fluid appreciated.  No evidence of abscess.   Neurological:      General: No focal deficit present.   Psychiatric:         Mood and Affect: Mood normal.         Assessment/Plan:     Diagnosis and associated orders:     1. Cellulitis of upper back excluding scapular region  cephALEXin (KEFLEX) 500 MG Cap    sulfamethoxazole-trimethoprim (BACTRIM DS) 800-160 MG tablet         Comments/MDM:     Patient presents urgent care with complaints of redness and swelling to his right upper back.  He denies any fevers.  He does report history of recent surgery in " October to his left hand.  Upon physical exam patient is alert no apparent signs of distress.  Neck is supple, no lymphadenopathy.  He is clear to auscultation bilaterally.  3.5 inch area in diameter of induration and erythema to right upper back.  Site is warm to touch.  No abscess appreciated.  Vital signs are stable in clinic, he is afebrile.  Prescription for Keflex and Bactrim sent to preferred pharmacy for the treatment of cellulitis.  Patient counseled on appropriate use of medication.  Borders drawn to area of induration.  Patient counseled to monitor for signs and symptoms of improvement when starting antibiotics.  Instructed to return to ER or urgent care if symptoms worsen or fail to improve.         Differential diagnosis, natural history, supportive care, and indications for immediate follow-up discussed.    Advised the patient to follow-up with the primary care physician for recheck, reevaluation, and consideration of further management.    Please note that this dictation was created using voice recognition software. I have made a reasonable attempt to correct obvious errors, but I expect that there are errors of grammar and possibly content that I did not discover before finalizing the note.    This note was electronically signed by JONATHAN Zaman

## 2025-06-09 ENCOUNTER — APPOINTMENT (OUTPATIENT)
Dept: URGENT CARE | Facility: CLINIC | Age: 35
End: 2025-06-09